# Patient Record
Sex: FEMALE | Race: WHITE | NOT HISPANIC OR LATINO | Employment: FULL TIME | URBAN - METROPOLITAN AREA
[De-identification: names, ages, dates, MRNs, and addresses within clinical notes are randomized per-mention and may not be internally consistent; named-entity substitution may affect disease eponyms.]

---

## 2024-01-25 LAB — HIV 1+2 AB+HIV1 P24 AG SERPL QL IA: NONREACTIVE

## 2024-01-30 LAB
C TRACH RRNA SPEC QL PROBE: NEGATIVE
HBV SURFACE AG SERPL QL IA: NEGATIVE
N GONORRHOEAE, AMPLIFIED DNA: NEGATIVE
RPR: NORMAL
RUBELLA IMMUNE STATUS: NORMAL

## 2024-09-30 ENCOUNTER — ANESTHESIA EVENT (OUTPATIENT)
Dept: OBSTETRICS AND GYNECOLOGY | Facility: HOSPITAL | Age: 24
End: 2024-09-30
Payer: COMMERCIAL

## 2024-09-30 ENCOUNTER — HOSPITAL ENCOUNTER (INPATIENT)
Facility: HOSPITAL | Age: 24
LOS: 2 days | Discharge: HOME | End: 2024-10-02
Attending: OBSTETRICS & GYNECOLOGY | Admitting: OBSTETRICS & GYNECOLOGY
Payer: COMMERCIAL

## 2024-09-30 ENCOUNTER — ANESTHESIA (OUTPATIENT)
Dept: OBSTETRICS AND GYNECOLOGY | Facility: HOSPITAL | Age: 24
End: 2024-09-30
Payer: COMMERCIAL

## 2024-09-30 VITALS — RESPIRATION RATE: 20 BRPM

## 2024-09-30 DIAGNOSIS — O48.0 POST-TERM PREGNANCY, 40-42 WEEKS OF GESTATION: ICD-10-CM

## 2024-09-30 DIAGNOSIS — Z37.9 NORMAL LABOR: ICD-10-CM

## 2024-09-30 LAB
BASOPHILS # BLD AUTO: 0.03 X10(3)/MCL
BASOPHILS NFR BLD AUTO: 0.2 %
EOSINOPHIL # BLD AUTO: 0.01 X10(3)/MCL (ref 0–0.9)
EOSINOPHIL NFR BLD AUTO: 0.1 %
ERYTHROCYTE [DISTWIDTH] IN BLOOD BY AUTOMATED COUNT: 13.2 % (ref 11.5–17)
GROUP & RH: NORMAL
HCT VFR BLD AUTO: 36 % (ref 37–47)
HGB BLD-MCNC: 12.8 G/DL (ref 12–16)
IMM GRANULOCYTES # BLD AUTO: 0.08 X10(3)/MCL (ref 0–0.04)
IMM GRANULOCYTES NFR BLD AUTO: 0.6 %
INDIRECT COOMBS: NORMAL
LYMPHOCYTES # BLD AUTO: 1.84 X10(3)/MCL (ref 0.6–4.6)
LYMPHOCYTES NFR BLD AUTO: 14.1 %
MCH RBC QN AUTO: 31.1 PG (ref 27–31)
MCHC RBC AUTO-ENTMCNC: 35.6 G/DL (ref 33–36)
MCV RBC AUTO: 87.6 FL (ref 80–94)
MONOCYTES # BLD AUTO: 0.64 X10(3)/MCL (ref 0.1–1.3)
MONOCYTES NFR BLD AUTO: 4.9 %
NEUTROPHILS # BLD AUTO: 10.45 X10(3)/MCL (ref 2.1–9.2)
NEUTROPHILS NFR BLD AUTO: 80.1 %
NRBC BLD AUTO-RTO: 0 %
PLATELET # BLD AUTO: 237 X10(3)/MCL (ref 130–400)
PMV BLD AUTO: 10.7 FL (ref 7.4–10.4)
RBC # BLD AUTO: 4.11 X10(6)/MCL (ref 4.2–5.4)
SPECIMEN OUTDATE: NORMAL
T PALLIDUM AB SER QL: NONREACTIVE
WBC # BLD AUTO: 13.05 X10(3)/MCL (ref 4.5–11.5)

## 2024-09-30 PROCEDURE — 51702 INSERT TEMP BLADDER CATH: CPT

## 2024-09-30 PROCEDURE — 63600175 PHARM REV CODE 636 W HCPCS: Performed by: OBSTETRICS & GYNECOLOGY

## 2024-09-30 PROCEDURE — 63600175 PHARM REV CODE 636 W HCPCS: Mod: JZ,JG | Performed by: ANESTHESIOLOGY

## 2024-09-30 PROCEDURE — 86900 BLOOD TYPING SEROLOGIC ABO: CPT | Performed by: OBSTETRICS & GYNECOLOGY

## 2024-09-30 PROCEDURE — 59409 OBSTETRICAL CARE: CPT | Mod: ,,, | Performed by: ANESTHESIOLOGY

## 2024-09-30 PROCEDURE — 99285 EMERGENCY DEPT VISIT HI MDM: CPT | Mod: 25

## 2024-09-30 PROCEDURE — 62326 NJX INTERLAMINAR LMBR/SAC: CPT | Performed by: ANESTHESIOLOGY

## 2024-09-30 PROCEDURE — 11000001 HC ACUTE MED/SURG PRIVATE ROOM

## 2024-09-30 PROCEDURE — 86850 RBC ANTIBODY SCREEN: CPT | Performed by: OBSTETRICS & GYNECOLOGY

## 2024-09-30 PROCEDURE — 86901 BLOOD TYPING SEROLOGIC RH(D): CPT | Performed by: OBSTETRICS & GYNECOLOGY

## 2024-09-30 PROCEDURE — 85025 COMPLETE CBC W/AUTO DIFF WBC: CPT | Performed by: OBSTETRICS & GYNECOLOGY

## 2024-09-30 PROCEDURE — 63600175 PHARM REV CODE 636 W HCPCS: Performed by: STUDENT IN AN ORGANIZED HEALTH CARE EDUCATION/TRAINING PROGRAM

## 2024-09-30 PROCEDURE — 86780 TREPONEMA PALLIDUM: CPT | Performed by: OBSTETRICS & GYNECOLOGY

## 2024-09-30 PROCEDURE — 72100002 HC LABOR CARE, 1ST 8 HOURS

## 2024-09-30 RX ORDER — OXYTOCIN-SODIUM CHLORIDE 0.9% IV SOLN 30 UNIT/500ML 30-0.9/5 UT/ML-%
95 SOLUTION INTRAVENOUS ONCE AS NEEDED
Status: DISCONTINUED | OUTPATIENT
Start: 2024-09-30 | End: 2024-10-01 | Stop reason: SDUPTHER

## 2024-09-30 RX ORDER — FENTANYL/BUPIVACAINE/NS/PF 2-1250MCG
PLASTIC BAG, INJECTION (ML) INJECTION CONTINUOUS PRN
Status: DISCONTINUED | OUTPATIENT
Start: 2024-09-30 | End: 2024-10-01

## 2024-09-30 RX ORDER — ACETAMINOPHEN 325 MG/1
650 TABLET ORAL EVERY 6 HOURS PRN
Status: DISCONTINUED | OUTPATIENT
Start: 2024-09-30 | End: 2024-10-02 | Stop reason: HOSPADM

## 2024-09-30 RX ORDER — EPHEDRINE SULFATE 50 MG/ML
10 INJECTION, SOLUTION INTRAVENOUS
OUTPATIENT
Start: 2024-09-30

## 2024-09-30 RX ORDER — OXYTOCIN 10 [USP'U]/ML
10 INJECTION, SOLUTION INTRAMUSCULAR; INTRAVENOUS ONCE AS NEEDED
Status: DISCONTINUED | OUTPATIENT
Start: 2024-09-30 | End: 2024-10-01 | Stop reason: SDUPTHER

## 2024-09-30 RX ORDER — METHYLERGONOVINE MALEATE 0.2 MG/ML
200 INJECTION INTRAVENOUS ONCE AS NEEDED
Status: DISCONTINUED | OUTPATIENT
Start: 2024-09-30 | End: 2024-10-01 | Stop reason: SDUPTHER

## 2024-09-30 RX ORDER — BUTORPHANOL TARTRATE 2 MG/ML
1 INJECTION INTRAMUSCULAR; INTRAVENOUS
Status: DISCONTINUED | OUTPATIENT
Start: 2024-09-30 | End: 2024-10-02 | Stop reason: HOSPADM

## 2024-09-30 RX ORDER — SODIUM CITRATE AND CITRIC ACID MONOHYDRATE 334; 500 MG/5ML; MG/5ML
30 SOLUTION ORAL ONCE
OUTPATIENT
Start: 2024-09-30 | End: 2024-09-30

## 2024-09-30 RX ORDER — MISOPROSTOL 100 UG/1
800 TABLET ORAL ONCE AS NEEDED
Status: DISCONTINUED | OUTPATIENT
Start: 2024-09-30 | End: 2024-10-01 | Stop reason: SDUPTHER

## 2024-09-30 RX ORDER — OXYTOCIN-SODIUM CHLORIDE 0.9% IV SOLN 30 UNIT/500ML 30-0.9/5 UT/ML-%
0-32 SOLUTION INTRAVENOUS CONTINUOUS
Status: DISCONTINUED | OUTPATIENT
Start: 2024-09-30 | End: 2024-10-02 | Stop reason: HOSPADM

## 2024-09-30 RX ORDER — OXYTOCIN-SODIUM CHLORIDE 0.9% IV SOLN 30 UNIT/500ML 30-0.9/5 UT/ML-%
10 SOLUTION INTRAVENOUS ONCE AS NEEDED
Status: DISCONTINUED | OUTPATIENT
Start: 2024-09-30 | End: 2024-10-02 | Stop reason: HOSPADM

## 2024-09-30 RX ORDER — MUPIROCIN 20 MG/G
OINTMENT TOPICAL
Status: DISCONTINUED | OUTPATIENT
Start: 2024-09-30 | End: 2024-10-02 | Stop reason: HOSPADM

## 2024-09-30 RX ORDER — OXYTOCIN-SODIUM CHLORIDE 0.9% IV SOLN 30 UNIT/500ML 30-0.9/5 UT/ML-%
95 SOLUTION INTRAVENOUS CONTINUOUS PRN
Status: DISCONTINUED | OUTPATIENT
Start: 2024-09-30 | End: 2024-10-01

## 2024-09-30 RX ORDER — BUPIVACAINE HYDROCHLORIDE 2.5 MG/ML
INJECTION, SOLUTION EPIDURAL; INFILTRATION; INTRACAUDAL
Status: COMPLETED | OUTPATIENT
Start: 2024-09-30 | End: 2024-09-30

## 2024-09-30 RX ORDER — CALCIUM CARBONATE 200(500)MG
500 TABLET,CHEWABLE ORAL 3 TIMES DAILY PRN
Status: DISCONTINUED | OUTPATIENT
Start: 2024-09-30 | End: 2024-10-02 | Stop reason: HOSPADM

## 2024-09-30 RX ORDER — BUTORPHANOL TARTRATE 2 MG/ML
2 INJECTION INTRAMUSCULAR; INTRAVENOUS
Status: DISCONTINUED | OUTPATIENT
Start: 2024-09-30 | End: 2024-10-02 | Stop reason: HOSPADM

## 2024-09-30 RX ORDER — SIMETHICONE 80 MG
1 TABLET,CHEWABLE ORAL 4 TIMES DAILY PRN
Status: DISCONTINUED | OUTPATIENT
Start: 2024-09-30 | End: 2024-10-01

## 2024-09-30 RX ORDER — LIDOCAINE HYDROCHLORIDE 10 MG/ML
10 INJECTION, SOLUTION INFILTRATION; PERINEURAL ONCE AS NEEDED
Status: DISCONTINUED | OUTPATIENT
Start: 2024-09-30 | End: 2024-10-02 | Stop reason: HOSPADM

## 2024-09-30 RX ORDER — ONDANSETRON 4 MG/1
8 TABLET, ORALLY DISINTEGRATING ORAL EVERY 8 HOURS PRN
Status: DISCONTINUED | OUTPATIENT
Start: 2024-09-30 | End: 2024-10-01 | Stop reason: SDUPTHER

## 2024-09-30 RX ORDER — CARBOPROST TROMETHAMINE 250 UG/ML
250 INJECTION, SOLUTION INTRAMUSCULAR
Status: DISCONTINUED | OUTPATIENT
Start: 2024-09-30 | End: 2024-10-01 | Stop reason: SDUPTHER

## 2024-09-30 RX ORDER — SODIUM CHLORIDE, SODIUM LACTATE, POTASSIUM CHLORIDE, CALCIUM CHLORIDE 600; 310; 30; 20 MG/100ML; MG/100ML; MG/100ML; MG/100ML
INJECTION, SOLUTION INTRAVENOUS CONTINUOUS
Status: DISCONTINUED | OUTPATIENT
Start: 2024-09-30 | End: 2024-10-02 | Stop reason: HOSPADM

## 2024-09-30 RX ORDER — DIPHENOXYLATE HYDROCHLORIDE AND ATROPINE SULFATE 2.5; .025 MG/1; MG/1
2 TABLET ORAL EVERY 6 HOURS PRN
Status: DISCONTINUED | OUTPATIENT
Start: 2024-09-30 | End: 2024-10-01 | Stop reason: SDUPTHER

## 2024-09-30 RX ADMIN — SODIUM CHLORIDE, POTASSIUM CHLORIDE, SODIUM LACTATE AND CALCIUM CHLORIDE: 600; 310; 30; 20 INJECTION, SOLUTION INTRAVENOUS at 12:09

## 2024-09-30 RX ADMIN — Medication 2 MILLI-UNITS/MIN: at 03:09

## 2024-09-30 RX ADMIN — SODIUM CHLORIDE, POTASSIUM CHLORIDE, SODIUM LACTATE AND CALCIUM CHLORIDE: 600; 310; 30; 20 INJECTION, SOLUTION INTRAVENOUS at 11:09

## 2024-09-30 RX ADMIN — Medication 12 ML/HR: at 03:09

## 2024-09-30 RX ADMIN — SODIUM CHLORIDE, POTASSIUM CHLORIDE, SODIUM LACTATE AND CALCIUM CHLORIDE: 600; 310; 30; 20 INJECTION, SOLUTION INTRAVENOUS at 02:09

## 2024-09-30 RX ADMIN — SODIUM CHLORIDE, POTASSIUM CHLORIDE, SODIUM LACTATE AND CALCIUM CHLORIDE: 600; 310; 30; 20 INJECTION, SOLUTION INTRAVENOUS at 05:09

## 2024-09-30 RX ADMIN — BUPIVACAINE HYDROCHLORIDE 10 ML: 2.5 INJECTION, SOLUTION EPIDURAL; INFILTRATION; INTRACAUDAL; PERINEURAL at 02:09

## 2024-09-30 NOTE — ANESTHESIA PROCEDURE NOTES
Epidural    Patient location during procedure: OB   Reason for block: primary anesthetic   Reason for block: labor analgesia requested by patient and obstetrician  Diagnosis: Active Labor   Start time: 9/30/2024 2:27 PM  Timeout: 9/30/2024 2:26 PM  End time: 9/30/2024 3:02 PM    Staffing  Performing Provider: Carlyle Bourne MD  Authorizing Provider: Carlyle Bourne MD    Staffing  Performed by: Carlyle Bourne MD  Authorized by: Carlyle Bourne MD        Preanesthetic Checklist  Completed: patient identified, IV checked, site marked, risks and benefits discussed, surgical consent, monitors and equipment checked, pre-op evaluation, timeout performed, anesthesia consent given, hand hygiene performed and patient being monitored  Preparation  Patient position: sitting  Prep: ChloraPrep  Patient monitoring: Blood Pressure  Reason for block: primary anesthetic   Epidural  Skin Anesthetic: lidocaine 1%  Administration type: continuous  Approach: midline  Interspace: L3-4    Injection technique: HARPREET saline  Needle and Epidural Catheter  Needle type: Tuohy   Needle gauge: 17  Needle length: 3.5 inches  Catheter type: Traxer  Catheter size: 19 G  Insertion Attempts: 2  Test dose: 3 mL of lidocaine 1.5% with Epi 1-to-200,000  Additional Documentation: negative aspiration for heme and CSF, incremental injection, no paresthesia on injection, no signs/symptoms of IV or SA injection and no significant pain on injection  Needle localization: anatomical landmarks  Assessment  Ease of block: difficult (deep sub-q tissue, tight IS)  Patient's tolerance of the procedure: no complaints  Additional Notes  Initial Bolus: Marcaine 0.25%  (4+3+3) ml, incrementally injected, aspirating b/w each injection for a total of 10 ml.   PCE : Continuous Infusion:12 ml/Hr, Bolus Dose: 4 ml q 15 min, LO: 4/Hr . Patient reports good analgesia after epidural bolus.   No inadvertent dural puncture with Tuohy.  Dural puncture not  performed with spinal needle    Medications:    Medications: bupivacaine (pf) (MARCAINE) injection 0.25% - Epidural   10 mL - 9/30/2024 2:47:00 PM

## 2024-09-30 NOTE — ED PROVIDER NOTES
TYLER NOTE     Admit Date: 2024  TYLER Physician: Vik Juarez  Primary OBGYN:Dr. Zain Pickett    Admit Diagnosis/Chief Complaint: Contractions      Chief Complaint   Patient presents with    Abdominal Pain     Ctx since 0830 yesterday; pt states she was planning a home birth with midwife but now states she needs rest, midwife was uncertain as to her cervical dilation status; pt states she would like pain management       Hospital Course:  Kaleigh Tate is a 24 y.o.  at 41w1d presents complaining of prolonged labor.  She was trying to deliver at home with a midwife and they were having some difficulty checking her and ascertaining her exact dilation.  She had made contact with  in the last several weeks for confirmatory ultrasound for position and estimated gestational age and group B strep.  He is their backup physician.  It appears that she has been in labor for a day and a half with minimal change and she is now ready for hospital delivery and epidural.  There has been no rupture of membranes.      Patient states no complications in this pregnancy.     PMHx:   Past Medical History:   Diagnosis Date    Known health problems: none        PSHx:   Past Surgical History:   Procedure Laterality Date    NO PAST SURGERIES         Patient denies vaginal bleeding, headache, vision changes, RUQ pain, dysuria, fever, and nausea/vomiting.  Fetal Movement: normal.    /85   Pulse 73   Temp 98.4 °F (36.9 °C)   Resp 18   LMP 2023   SpO2 99%   Breastfeeding No   Temp:  [98.4 °F (36.9 °C)] 98.4 °F (36.9 °C)  Pulse:  [73] 73  Resp:  [18] 18  SpO2:  [99 %] 99 %  BP: (135)/(85) 135/85    General: in no apparent distress well developed and well nourished alert oriented times 3  Cardiovascular: Regular rate and rhythm  Lungs: Clear to auscultation bilaterally  Abdominal: soft, nontender, nondistended, no abnormal masses, no epigastric pain fundus soft, nontender consistent with 40 weeks size  FHT present  Back: lumbar tenderness absent   CVA tenderness none  Extremeties no redness or tenderness in the calves or thighs edema trace long term up the legs bilaterally    SSE:   SVE:SVE (PeriWATCH)  Dilation (cm): 3  Effacement (%): 70  Station: -2  Cervical Position: Posterior  Cervical Consistency: Soft  Examined by:: BROOKE Urias Rn  Finley Score: 7  Simplified Finley Score: 5     FHT:  120's, Reactive, Variable decelerations, Category 2, and Reassuring for gestational age  TOCO: Contractions every 5-8 min    Prenatal labs:  A, Rh positive, and Group B strep negative    LABS:   No results found for this or any previous visit (from the past 24 hours).      Imaging Results    None          ASSESMENT: Kaleigh Tate is a 24 y.o.   at 41w1d with prolonged latent labor and category 2 tracing.  Admission to labor and delivery and to primary care OB team for delivery.  Start IV fluids, labs, epidural when available and Pitocin augmentation as needed.  Dr. Baron who is covering for her primary OB has been contacted.          Discharge Diagnosis/Clinical Impression:  :  Normal labor (Primary)  Post-term pregnancy, 40-42 weeks of gestation    Status:Stable      This note was created with the assistance of [employer] voice recognition software. There may be transcription errors as a result of using this technology however minimal. Effort has been made to assure accuracy of transcription but any obvious errors or omissions should be clarified with the author of the document.

## 2024-09-30 NOTE — PROGRESS NOTES
L&D progress note    In to reassess and AROM.      Pt much more comfortable s/p epidural    /62   Pulse 91   Temp 98.2 °F (36.8 °C)   Resp 18   LMP 12/13/2023   SpO2 100%   Breastfeeding No     NAD  Well perfused  Nonlabored breathing  SVE 3/70/-3 at last RN exam    Cat 1 tracing    A/P:  Pt declines AROM  Continue trev Baron MD  09/30/2024

## 2024-09-30 NOTE — H&P
L&D - History & Physical    Chief Complaint: ctx     HPI:      Kaleigh Tate is a 24 y.o.  at 41w1d who presents today for evaluation of above chief complaint.      Abdominal Pain (Ctx since 0830 yesterday; pt states she was planning a home birth with midwife but now states she needs rest, midwife was uncertain as to her cervical dilation status; pt states she would like pain management)    Was doing home birth with midwife, and after 18hrs, pt desires epidural.  Has seen Dr TAYLOR this pregnancy .      Patient's last menstrual period was 2023.     OB History    Para Term  AB Living   1             SAB IAB Ectopic Multiple Live Births                  # Outcome Date GA Lbr David/2nd Weight Sex Type Anes PTL Lv   1 Current                Past Medical History:   Diagnosis Date    Known health problems: none        Past Surgical History:   Procedure Laterality Date    NO PAST SURGERIES         Family History   Problem Relation Name Age of Onset    Diabetes Father         Social History     Socioeconomic History    Marital status: Single   Tobacco Use    Smoking status: Never   Substance and Sexual Activity    Alcohol use: Not Currently    Sexual activity: Yes     Birth control/protection: None       Current Facility-Administered Medications   Medication Dose Route Frequency Provider Last Rate Last Admin    acetaminophen tablet 650 mg  650 mg Oral Q6H PRN Vik Juarez DO        butorphanol injection 1 mg  1 mg Intravenous Q3H PRN Vik Juarez DO        butorphanol injection 2 mg  2 mg Intravenous Q3H PRN Vik Juarez DO        calcium carbonate 200 mg calcium (500 mg) chewable tablet 500 mg  500 mg Oral TID PRN Vik Juarez DO        carboprost injection 250 mcg  250 mcg Intramuscular Q15 Min PRN Vik Juarez DO        diphenoxylate-atropine 2.5-0.025 mg per tablet 2 tablet  2 tablet Oral Q6H PRN Vik Juarez DO        lactated  ringers bolus 1,000 mL  1,000 mL Intravenous PRN Vik Juarez,  mL/hr at 09/30/24 1318 1,000 mL at 09/30/24 1318    lactated ringers bolus 500 mL  500 mL Intravenous PRN Vik Juarez, DO        lactated ringers bolus 500 mL  500 mL Intravenous Once PRN Conor Baron MD        lactated ringers infusion   Intravenous Continuous Vik Juarez  mL/hr at 09/30/24 1231 New Bag at 09/30/24 1231    LIDOcaine HCL 10 mg/ml (1%) injection 10 mL  10 mL Intradermal Once PRN Vik Juarez DO        methylergonovine injection 200 mcg  200 mcg Intramuscular Once PRN Vki Juarez DO        miSOPROStoL tablet 800 mcg  800 mcg Rectal Once PRN Vik Juarez, DO        miSOPROStoL tablet 800 mcg  800 mcg Oral Once PRN Vik Juarez DO        mupirocin 2 % ointment   Nasal On Call Procedure Vik Juarez DO        ondansetron disintegrating tablet 8 mg  8 mg Oral Q8H PRN iVk Juarez DO        oxytocin 30 units/500 mL (60 milliunits/mL) in 0.9% NaCl (non-titrating)  95 manpreet-units/min Intravenous Continuous PRN Vik Juarez DO        oxytocin 30 units/500 mL (60 milliunits/mL) in 0.9% NaCl (non-titrating)  95 manpreet-units/min Intravenous Once PRN Vik Juarez DO        oxytocin 30 units/500 mL (60 milliunits/mL) in 0.9% NaCl (TITRATING)  0-32 manpreet-units/min Intravenous Continuous Conor Baron MD        oxytocin 30 units/500 mL (60 milliunits/mL) in 0.9% NaCl IV bolus from bag  10 Units Intravenous Once PRN Vik Juarez DO        oxytocin injection 10 Units  10 Units Intramuscular Once PRN Vik Juarez DO        simethicone chewable tablet 80 mg  1 tablet Oral QID PRN Vik Juarez DO        tranexamic acid (CYKLOKAPRON) 1,000 mg in 0.9% NaCl 100 mL IVPB (MB+)  1,000 mg Intravenous Q30 Min PRN Vik Juarez, DO           Review of patient's allergies indicates:  No Known  Allergies      Physical Exam:      BP (!) 140/79   Pulse 70   Temp 98.4 °F (36.9 °C)   Resp 18   LMP 12/13/2023   SpO2 99%   Breastfeeding No   There is no height or weight on file to calculate BMI.     APPEARANCE: Well nourished, well developed, in no acute distress.  PSYCH: Appropriate mood and affect.  CHEST: Normal respiratory effort,  CV: Normal capillary refill.  ABDOMEN: Soft.  No tenderness or masses.    PELVIC:  3/70/-2 at last exam  EXTREMITIES: No edema       Results:     Results for orders placed or performed during the hospital encounter of 09/30/24   SYPHILIS ANTIBODY (WITH REFLEX RPR)    Collection Time: 09/30/24 12:07 PM   Result Value Ref Range    Syphilis Antibody Nonreactive Nonreactive, Equivocal   CBC with Differential    Collection Time: 09/30/24 12:07 PM   Result Value Ref Range    WBC 13.05 (H) 4.50 - 11.50 x10(3)/mcL    RBC 4.11 (L) 4.20 - 5.40 x10(6)/mcL    Hgb 12.8 12.0 - 16.0 g/dL    Hct 36.0 (L) 37.0 - 47.0 %    MCV 87.6 80.0 - 94.0 fL    MCH 31.1 (H) 27.0 - 31.0 pg    MCHC 35.6 33.0 - 36.0 g/dL    RDW 13.2 11.5 - 17.0 %    Platelet 237 130 - 400 x10(3)/mcL    MPV 10.7 (H) 7.4 - 10.4 fL    Neut % 80.1 %    Lymph % 14.1 %    Mono % 4.9 %    Eos % 0.1 %    Basophil % 0.2 %    Lymph # 1.84 0.6 - 4.6 x10(3)/mcL    Neut # 10.45 (H) 2.1 - 9.2 x10(3)/mcL    Mono # 0.64 0.1 - 1.3 x10(3)/mcL    Eos # 0.01 0 - 0.9 x10(3)/mcL    Baso # 0.03 <=0.2 x10(3)/mcL    IG# 0.08 (H) 0 - 0.04 x10(3)/mcL    IG% 0.6 %    NRBC% 0.0 %         Assessment/Plan:     Active Problem List with Overview Notes    Diagnosis Date Noted    Normal labor 09/30/2024    Post-term pregnancy, 40-42 weeks of gestation 09/30/2024      Start augmentation after epidural    Conor Baron MD  09/30/2024 1:38 PM

## 2024-09-30 NOTE — ANESTHESIA PREPROCEDURE EVALUATION
2024  Kaleigh Tate is a 24 y.o., female, , EGA 41 wks, in active labor who requests a TRINA for labor analgesia      Pre-op Assessment    I have reviewed the Patient Summary Reports.     I have reviewed the Nursing Notes. I have reviewed the NPO Status.   I have reviewed the Medications.     Review of Systems  Anesthesia Hx:  No problems with previous Anesthesia             Denies Family Hx of Anesthesia complications.    Denies Personal Hx of Anesthesia complications.                    Social:  Non-Smoker       Cardiovascular:  Cardiovascular Normal                                            Pulmonary:  Pulmonary Normal                       Endocrine:  Endocrine Normal          Obesity / BMI > 30      Physical Exam  General: Alert and Oriented  In active labor  Airway:  Mallampati: II   Mouth Opening: Normal  TM Distance: Normal  Tongue: Normal  Neck ROM: Normal ROM    Dental:  Intact    Chest/Lungs:  Normal Respiratory Rate    Heart:  Rate: Normal  Rhythm: Regular Rhythm        Anesthesia Plan  Type of Anesthesia, risks & benefits discussed:    Anesthesia Type: Epidural  Intra-op Monitoring Plan: Standard ASA Monitors  Post Op Pain Control Plan: epidural analgesia  Induction:  IV  Informed Consent: Informed consent signed with the Patient and all parties understand the risks and agree with anesthesia plan.  All questions answered. Patient consented to blood products? Yes  ASA Score: 2  Day of Surgery Review of History & Physical: H&P Update referred to the surgeon/provider.  Anesthesia Plan Notes: Premedication: Oral Bicitra and Lactated Ringers Preload  Technique: Epidural for labor analgesia - plan to use if functioning well if C/S is required for delivery  Please refer to nursing records for toco fetal heart tone information    Ready For Surgery From Anesthesia Perspective.     .

## 2024-10-01 PROCEDURE — 63600175 PHARM REV CODE 636 W HCPCS: Performed by: STUDENT IN AN ORGANIZED HEALTH CARE EDUCATION/TRAINING PROGRAM

## 2024-10-01 PROCEDURE — 11000001 HC ACUTE MED/SURG PRIVATE ROOM

## 2024-10-01 PROCEDURE — 72200005 HC VAGINAL DELIVERY LEVEL II

## 2024-10-01 PROCEDURE — 25000003 PHARM REV CODE 250: Performed by: ANESTHESIOLOGY

## 2024-10-01 PROCEDURE — 25000003 PHARM REV CODE 250: Performed by: STUDENT IN AN ORGANIZED HEALTH CARE EDUCATION/TRAINING PROGRAM

## 2024-10-01 PROCEDURE — 0KQM0ZZ REPAIR PERINEUM MUSCLE, OPEN APPROACH: ICD-10-PCS | Performed by: STUDENT IN AN ORGANIZED HEALTH CARE EDUCATION/TRAINING PROGRAM

## 2024-10-01 PROCEDURE — 25000003 PHARM REV CODE 250: Performed by: OBSTETRICS & GYNECOLOGY

## 2024-10-01 RX ORDER — PRENATAL WITH FERROUS FUM AND FOLIC ACID 3080; 920; 120; 400; 22; 1.84; 3; 20; 10; 1; 12; 200; 27; 25; 2 [IU]/1; [IU]/1; MG/1; [IU]/1; MG/1; MG/1; MG/1; MG/1; MG/1; MG/1; UG/1; MG/1; MG/1; MG/1; MG/1
1 TABLET ORAL DAILY
Status: DISCONTINUED | OUTPATIENT
Start: 2024-10-01 | End: 2024-10-02 | Stop reason: HOSPADM

## 2024-10-01 RX ORDER — SODIUM CHLORIDE 0.9 % (FLUSH) 0.9 %
10 SYRINGE (ML) INJECTION
Status: DISCONTINUED | OUTPATIENT
Start: 2024-10-01 | End: 2024-10-02 | Stop reason: HOSPADM

## 2024-10-01 RX ORDER — METHYLERGONOVINE MALEATE 0.2 MG/ML
200 INJECTION INTRAVENOUS ONCE AS NEEDED
Status: DISCONTINUED | OUTPATIENT
Start: 2024-10-01 | End: 2024-10-02 | Stop reason: HOSPADM

## 2024-10-01 RX ORDER — DOCUSATE SODIUM 100 MG/1
200 CAPSULE, LIQUID FILLED ORAL 2 TIMES DAILY PRN
Status: DISCONTINUED | OUTPATIENT
Start: 2024-10-01 | End: 2024-10-02 | Stop reason: HOSPADM

## 2024-10-01 RX ORDER — MISOPROSTOL 100 UG/1
800 TABLET ORAL ONCE AS NEEDED
Status: DISCONTINUED | OUTPATIENT
Start: 2024-10-01 | End: 2024-10-02 | Stop reason: HOSPADM

## 2024-10-01 RX ORDER — OXYTOCIN-SODIUM CHLORIDE 0.9% IV SOLN 30 UNIT/500ML 30-0.9/5 UT/ML-%
95 SOLUTION INTRAVENOUS ONCE AS NEEDED
Status: DISCONTINUED | OUTPATIENT
Start: 2024-10-01 | End: 2024-10-02 | Stop reason: HOSPADM

## 2024-10-01 RX ORDER — IBUPROFEN 600 MG/1
600 TABLET ORAL EVERY 6 HOURS
Status: DISCONTINUED | OUTPATIENT
Start: 2024-10-01 | End: 2024-10-02 | Stop reason: HOSPADM

## 2024-10-01 RX ORDER — OXYTOCIN-SODIUM CHLORIDE 0.9% IV SOLN 30 UNIT/500ML 30-0.9/5 UT/ML-%
95 SOLUTION INTRAVENOUS CONTINUOUS PRN
Status: DISCONTINUED | OUTPATIENT
Start: 2024-10-01 | End: 2024-10-02 | Stop reason: HOSPADM

## 2024-10-01 RX ORDER — DIPHENHYDRAMINE HYDROCHLORIDE 50 MG/ML
25 INJECTION INTRAMUSCULAR; INTRAVENOUS EVERY 4 HOURS PRN
Status: DISCONTINUED | OUTPATIENT
Start: 2024-10-01 | End: 2024-10-02 | Stop reason: HOSPADM

## 2024-10-01 RX ORDER — DIPHENOXYLATE HYDROCHLORIDE AND ATROPINE SULFATE 2.5; .025 MG/1; MG/1
2 TABLET ORAL EVERY 6 HOURS PRN
Status: DISCONTINUED | OUTPATIENT
Start: 2024-10-01 | End: 2024-10-02 | Stop reason: HOSPADM

## 2024-10-01 RX ORDER — CARBOPROST TROMETHAMINE 250 UG/ML
250 INJECTION, SOLUTION INTRAMUSCULAR
Status: DISCONTINUED | OUTPATIENT
Start: 2024-10-01 | End: 2024-10-02 | Stop reason: HOSPADM

## 2024-10-01 RX ORDER — DIPHENHYDRAMINE HCL 25 MG
25 CAPSULE ORAL EVERY 4 HOURS PRN
Status: DISCONTINUED | OUTPATIENT
Start: 2024-10-01 | End: 2024-10-02 | Stop reason: HOSPADM

## 2024-10-01 RX ORDER — SIMETHICONE 80 MG
1 TABLET,CHEWABLE ORAL EVERY 6 HOURS PRN
Status: DISCONTINUED | OUTPATIENT
Start: 2024-10-01 | End: 2024-10-02 | Stop reason: HOSPADM

## 2024-10-01 RX ORDER — OXYTOCIN 10 [USP'U]/ML
10 INJECTION, SOLUTION INTRAMUSCULAR; INTRAVENOUS ONCE AS NEEDED
Status: DISCONTINUED | OUTPATIENT
Start: 2024-10-01 | End: 2024-10-02 | Stop reason: HOSPADM

## 2024-10-01 RX ORDER — OXYTOCIN-SODIUM CHLORIDE 0.9% IV SOLN 30 UNIT/500ML 30-0.9/5 UT/ML-%
10 SOLUTION INTRAVENOUS ONCE AS NEEDED
Status: DISCONTINUED | OUTPATIENT
Start: 2024-10-01 | End: 2024-10-02 | Stop reason: HOSPADM

## 2024-10-01 RX ORDER — ACETAMINOPHEN 325 MG/1
650 TABLET ORAL EVERY 6 HOURS SCHEDULED
Status: DISCONTINUED | OUTPATIENT
Start: 2024-10-01 | End: 2024-10-02 | Stop reason: HOSPADM

## 2024-10-01 RX ORDER — OXYCODONE AND ACETAMINOPHEN 5; 325 MG/1; MG/1
1 TABLET ORAL EVERY 4 HOURS PRN
Status: DISCONTINUED | OUTPATIENT
Start: 2024-10-01 | End: 2024-10-02 | Stop reason: HOSPADM

## 2024-10-01 RX ORDER — HYDROCORTISONE 25 MG/G
CREAM TOPICAL 3 TIMES DAILY PRN
Status: DISCONTINUED | OUTPATIENT
Start: 2024-10-01 | End: 2024-10-02 | Stop reason: HOSPADM

## 2024-10-01 RX ORDER — ONDANSETRON 4 MG/1
8 TABLET, ORALLY DISINTEGRATING ORAL EVERY 8 HOURS PRN
Status: DISCONTINUED | OUTPATIENT
Start: 2024-10-01 | End: 2024-10-02 | Stop reason: HOSPADM

## 2024-10-01 RX ADMIN — PRENATAL VITAMINS-IRON FUMARATE 27 MG IRON-FOLIC ACID 0.8 MG TABLET 1 TABLET: at 09:10

## 2024-10-01 RX ADMIN — IBUPROFEN 600 MG: 600 TABLET, FILM COATED ORAL at 11:10

## 2024-10-01 RX ADMIN — IBUPROFEN 600 MG: 600 TABLET, FILM COATED ORAL at 05:10

## 2024-10-01 RX ADMIN — ACETAMINOPHEN 325MG 650 MG: 325 TABLET ORAL at 03:10

## 2024-10-01 RX ADMIN — ACETAMINOPHEN 325MG 650 MG: 325 TABLET ORAL at 09:10

## 2024-10-01 RX ADMIN — Medication 12 ML/HR: at 02:10

## 2024-10-01 RX ADMIN — TRANEXAMIC ACID 1000 MG: 100 INJECTION, SOLUTION INTRAVENOUS at 03:10

## 2024-10-01 RX ADMIN — OXYTOCIN 95 MILLI-UNITS/MIN: 10 INJECTION, SOLUTION INTRAMUSCULAR; INTRAVENOUS at 04:10

## 2024-10-01 RX ADMIN — ONDANSETRON 8 MG: 4 TABLET, ORALLY DISINTEGRATING ORAL at 01:10

## 2024-10-01 RX ADMIN — DOCUSATE SODIUM 200 MG: 100 CAPSULE, LIQUID FILLED ORAL at 09:10

## 2024-10-01 NOTE — PLAN OF CARE
Problem:  Fall Injury Risk  Goal: Absence of Fall, Infant Drop and Related Injury  Outcome: Progressing     Problem: Adult Inpatient Plan of Care  Goal: Plan of Care Review  Outcome: Progressing  Goal: Patient-Specific Goal (Individualized)  Outcome: Progressing  Goal: Absence of Hospital-Acquired Illness or Injury  Outcome: Progressing  Goal: Optimal Comfort and Wellbeing  Outcome: Progressing  Goal: Readiness for Transition of Care  Outcome: Progressing     Problem: Infection  Goal: Absence of Infection Signs and Symptoms  Outcome: Progressing     Problem: Pain Acute  Goal: Optimal Pain Control and Function  Outcome: Progressing     Problem: Postpartum (Vaginal Delivery)  Goal: Successful Parent Role Transition  Outcome: Progressing  Goal: Hemostasis  Outcome: Progressing  Goal: Absence of Infection Signs and Symptoms  Outcome: Progressing  Goal: Anesthesia/Sedation Recovery  Outcome: Progressing  Goal: Optimal Pain Control and Function  Outcome: Progressing  Goal: Effective Urinary Elimination  Outcome: Progressing

## 2024-10-01 NOTE — LACTATION NOTE
This note was copied from a baby's chart.  Baby was sleepy initially during assessment. Unable to get baby to wake up to feed. Unable to hand express colostrum. Discussed formula and donor milk. Mom has a cousin with colostrum that she wants to use. Donor milk (not from a milk bank) consent signed just in case. Pt agreed not to feed baby cousins colostrum until she informed nurses so nurses could inform physician prior to.   1015-baby eventually woke up and latched, nursed well with swallows heard. Mom c/o pain with initial latch, but pain subsided.

## 2024-10-01 NOTE — L&D DELIVERY NOTE
Message from [employer]:  Kiah Talbot would like a refill of the following medications:     dicyclomine (BENTYL) 10 MG capsule [Max Perry MD]   Patient Comment: Can I please get this perscription refilled. I just missed getting my refill which  on Lia 10, 2018    Preferred pharmacy: [id number] - [place], [place] - [address]  Delivery method: Pickup       Physician Delivery Note    24 y.o. year old  at 41w2d    Pregnancy Complication:    Patient Active Problem List    Diagnosis Date Noted    Normal labor 2024    Post-term pregnancy, 40-42 weeks of gestation 2024       Labor:  augmented    Anesthesia:  epidural    Episiotomy/Laceration/Repair:  large 2nd degree extending high into the vagina.  Repaired with 2-0 vicryl on a CT.    Delivery:       Position:  OA    Placenta:  spontaneous    Complications:  none    Comments:    Baby OA. Anterior and posterior shoulders atraumatically delivered with gentle traction followed by the trunk and LE. Infant vigorous with spontaneous cry. Baby placed on mother's chest for skin to skin contact. Pt declined cord clamping. Due to significant bleeding from the laceration, placenta delivered by controlled cord traction intact and placed with baby per maternal request. Normal uterine tone with oxytocin infusion.  Rectum and vagina clear of sponges.. [place] and patient stable in the delivery room with nursing present.    Conor Baron MD  10/01/2024 4:20 AM

## 2024-10-02 VITALS
HEART RATE: 67 BPM | SYSTOLIC BLOOD PRESSURE: 116 MMHG | OXYGEN SATURATION: 100 % | DIASTOLIC BLOOD PRESSURE: 72 MMHG | RESPIRATION RATE: 18 BRPM | TEMPERATURE: 98 F

## 2024-10-02 LAB
BASOPHILS # BLD AUTO: 0.05 X10(3)/MCL
BASOPHILS NFR BLD AUTO: 0.4 %
EOSINOPHIL # BLD AUTO: 0.14 X10(3)/MCL (ref 0–0.9)
EOSINOPHIL NFR BLD AUTO: 1.2 %
ERYTHROCYTE [DISTWIDTH] IN BLOOD BY AUTOMATED COUNT: 13.9 % (ref 11.5–17)
HCT VFR BLD AUTO: 26.2 % (ref 37–47)
HGB BLD-MCNC: 8.9 G/DL (ref 12–16)
IMM GRANULOCYTES # BLD AUTO: 0.06 X10(3)/MCL (ref 0–0.04)
IMM GRANULOCYTES NFR BLD AUTO: 0.5 %
LYMPHOCYTES # BLD AUTO: 3.33 X10(3)/MCL (ref 0.6–4.6)
LYMPHOCYTES NFR BLD AUTO: 27.8 %
MCH RBC QN AUTO: 31 PG (ref 27–31)
MCHC RBC AUTO-ENTMCNC: 34 G/DL (ref 33–36)
MCV RBC AUTO: 91.3 FL (ref 80–94)
MONOCYTES # BLD AUTO: 0.87 X10(3)/MCL (ref 0.1–1.3)
MONOCYTES NFR BLD AUTO: 7.3 %
NEUTROPHILS # BLD AUTO: 7.52 X10(3)/MCL (ref 2.1–9.2)
NEUTROPHILS NFR BLD AUTO: 62.8 %
NRBC BLD AUTO-RTO: 0 %
PLATELET # BLD AUTO: 211 X10(3)/MCL (ref 130–400)
PMV BLD AUTO: 11.2 FL (ref 7.4–10.4)
RBC # BLD AUTO: 2.87 X10(6)/MCL (ref 4.2–5.4)
WBC # BLD AUTO: 11.97 X10(3)/MCL (ref 4.5–11.5)

## 2024-10-02 PROCEDURE — 25000003 PHARM REV CODE 250: Performed by: STUDENT IN AN ORGANIZED HEALTH CARE EDUCATION/TRAINING PROGRAM

## 2024-10-02 PROCEDURE — 85025 COMPLETE CBC W/AUTO DIFF WBC: CPT | Performed by: STUDENT IN AN ORGANIZED HEALTH CARE EDUCATION/TRAINING PROGRAM

## 2024-10-02 PROCEDURE — 36415 COLL VENOUS BLD VENIPUNCTURE: CPT | Performed by: STUDENT IN AN ORGANIZED HEALTH CARE EDUCATION/TRAINING PROGRAM

## 2024-10-02 RX ADMIN — ACETAMINOPHEN 325MG 650 MG: 325 TABLET ORAL at 04:10

## 2024-10-02 RX ADMIN — IBUPROFEN 600 MG: 600 TABLET, FILM COATED ORAL at 06:10

## 2024-10-02 RX ADMIN — DOCUSATE SODIUM 200 MG: 100 CAPSULE, LIQUID FILLED ORAL at 12:10

## 2024-10-02 RX ADMIN — IBUPROFEN 600 MG: 600 TABLET, FILM COATED ORAL at 12:10

## 2024-10-02 RX ADMIN — ACETAMINOPHEN 325MG 650 MG: 325 TABLET ORAL at 02:10

## 2024-10-02 NOTE — PLAN OF CARE
Problem: Adult Inpatient Plan of Care  Goal: Plan of Care Review  Outcome: Met  Goal: Patient-Specific Goal (Individualized)  Outcome: Met  Goal: Absence of Hospital-Acquired Illness or Injury  Outcome: Met  Goal: Optimal Comfort and Wellbeing  Outcome: Met  Goal: Readiness for Transition of Care  Outcome: Met     Problem: Pain Acute  Goal: Optimal Pain Control and Function  Outcome: Met     Problem: Postpartum (Vaginal Delivery)  Goal: Successful Parent Role Transition  Outcome: Met  Goal: Hemostasis  Outcome: Met  Goal: Absence of Infection Signs and Symptoms  Outcome: Met  Goal: Anesthesia/Sedation Recovery  Outcome: Met  Goal: Optimal Pain Control and Function  Outcome: Met  Goal: Effective Urinary Elimination  Outcome: Met     Problem: Infection  Goal: Absence of Infection Signs and Symptoms  Outcome: Met

## 2024-10-02 NOTE — NURSING
Written discharge instructions were given and reviewed from the postpartum and  care [facility] booklet as well as prescribed meds, follow up appointments, and precautions to take at home. Also reviewed written and verbal instructions related to reasons to call MD/when to come back to the ER. Pt verbalized understanding.

## 2024-10-02 NOTE — PROGRESS NOTES
PostPartum Progress Note        Subjective:      Postpartum Day #1 after   .  Patient sleeping. FOB states:    pt is without complaints. Lochia decreasing.  Pain is well controlled. Patient is ambulating without lightheadedness. Tolerating regular diet.    Objective:      Temp:  [97.7 °F (36.5 °C)-99.4 °F (37.4 °C)] 97.9 °F (36.6 °C)  Pulse:  [70-80] 78  Resp:  [17-18] 18  SpO2:  [97 %-99 %] 98 %  BP: (110-144)/(71-83) 128/83  No intake or output data in the 24 hours ending 10/02/24 0657  There is no height or weight on file to calculate BMI.    General: no acute distress  Abdomen: soft, appropriately tender  Extremities: non-tender, symmetric    Group & Rh   Date Value Ref Range Status   2024 A POS  Final   2024 A POS  Final     Recent Results (from the past 2 weeks)   CBC with Differential    Collection Time: 10/02/24  4:15 AM   Result Value Ref Range    WBC 11.97 (H) 4.50 - 11.50 x10(3)/mcL    Hgb 8.9 (L) 12.0 - 16.0 g/dL    Hct 26.2 (L) 37.0 - 47.0 %    Platelet 211 130 - 400 x10(3)/mcL   CBC with Differential    Collection Time: 24 12:07 PM   Result Value Ref Range    WBC 13.05 (H) 4.50 - 11.50 x10(3)/mcL    Hgb 12.8 12.0 - 16.0 g/dL    Hct 36.0 (L) 37.0 - 47.0 %    Platelet 237 130 - 400 x10(3)/mcL          Assessment/Plan     24 y.o.  S/P , PPD # 1 - Doing Well   -Continue routine postpartum care  -Anticipated d/c PPD#2    Active Problem List with Overview Notes    Diagnosis Date Noted    Normal labor 2024    Post-term pregnancy, 40-42 weeks of gestation 2024           Conor Baron MD  10/02/2024 6:57 AM

## 2024-10-02 NOTE — ANESTHESIA POSTPROCEDURE EVALUATION
Anesthesia Post Evaluation    Patient: Kaleigh Tate    Procedure(s) Performed: * No procedures listed *    Final Anesthesia Type: epidural      Patient location during evaluation: floor  Patient participation: Yes- Able to Participate  Level of consciousness: awake and oriented  Post-procedure vital signs: reviewed and stable  Pain management: adequate  Airway patency: patent    PONV status at discharge: No PONV  Anesthetic complications: no      Cardiovascular status: stable and hemodynamically stable  Respiratory status: unassisted and spontaneous ventilation  Hydration status: euvolemic  Follow-up not needed.          Neuraxial Block resolved, Sensory & Motor intact, denies HA    Vitals Value Taken Time   /72 10/02/24 0728   Temp 36.6 °C (97.8 °F) 10/02/24 0728   Pulse 67 10/02/24 0728   Resp 18 10/01/24 1829   SpO2 100 % 10/02/24 0728         No case tracking events are documented in the log.      Pain/Charlotte Score: Pain Rating Prior to Med Admin: 5 (10/2/2024  6:12 AM)

## 2024-10-03 NOTE — DISCHARGE SUMMARY
"Delivery Discharge Summary  Obstetrics      Primary OB Clinician: CORETTA Pickett MD  Discharge Provider: Conor Baron MD    Admission date: 2024  Discharge date: 10/2/2024  4:32 PM 10/03/2024    Admit Dx:  Patient Active Problem List   Diagnosis    Normal labor    Post-term pregnancy, 40-42 weeks of gestation        Discharge Dx:    Same    Procedure: vaginal delivery    Hospital Course:  Kaleigh Tate is a 24 y.o. now  who was admitted on 2024 for delivery. Patient delivered a viable . Please see delivery note for further details. Pt was in stable condition post delivery and was transferred to the Mother-Baby Unit. Her postpartum course was uncomplicated. On the date of discharge, patient's pain is controlled with oral pain medications. She is tolerating ambulation without SOB or CP, and PO diet without N/V. Reported lochia is within the normal range. Pt in stable condition and ready for discharge.     Pertinent studies:  Postpartum CBC  Lab Results   Component Value Date    WBC 11.97 (H) 10/02/2024    HGB 8.9 (L) 10/02/2024    HCT 26.2 (L) 10/02/2024    MCV 91.3 10/02/2024     10/02/2024       Delivery:    Episiotomy: None   Lacerations: 2nd   Repair suture: None   Repair # of packets: 2   Blood loss (ml):       Birth information:  YOB: 2026   Time of birth: 3:31 AM   Sex: female   Delivery type: Vaginal, Spontaneous   Gestational Age: 41w2d     Measurements    Weight: 4180 g  Weight (lbs): 9 lb 3.4 oz  Length: 52.1 cm  Length (in): 20.5"  Head circumference: 34.9 cm         Delivery Clinician: Delivery Providers    Delivering clinician: Conor Baron MD   Provider Role    Lucretia Hand RN Registered Nurse    Marie Pratt RN Registered Nurse    Viviana Styles RN Registered Nurse    Danya Watts RN Registered Nurse             Additional  information:  Forceps:    Vacuum:    Breech:    Observed anomalies      Living?:     Apgars    Living status: " Living  Apgar Component Scores:  1 min.:  5 min.:  10 min.:  15 min.:  20 min.:    Skin color:  1  1       Heart rate:  2  2       Reflex irritability:  2  2       Muscle tone:  2  2       Respiratory effort:  1  1       Total:  8  8       Apgars assigned by: MICHELLE         Placenta: Delivered:       appearance    Disposition: To home, self care    Follow Up: 6 weeks    Patient Instructions:   1. Call the office for any bleeding >2 pads/hour for >2 hours, temperature >100.4, pain that is uncontrolled with medications, or for any other concerns.  2. Pelvic rest and no tub baths x 6 weeks.

## 2025-06-21 ENCOUNTER — HOSPITAL ENCOUNTER (EMERGENCY)
Facility: HOSPITAL | Age: 25
Discharge: HOME | End: 2025-06-21
Attending: STUDENT IN AN ORGANIZED HEALTH CARE EDUCATION/TRAINING PROGRAM
Payer: COMMERCIAL

## 2025-06-21 VITALS
SYSTOLIC BLOOD PRESSURE: 130 MMHG | HEIGHT: 64 IN | RESPIRATION RATE: 21 BRPM | DIASTOLIC BLOOD PRESSURE: 80 MMHG | HEART RATE: 83 BPM | WEIGHT: 200 LBS | BODY MASS INDEX: 34.15 KG/M2 | OXYGEN SATURATION: 100 % | TEMPERATURE: 98 F

## 2025-06-21 DIAGNOSIS — R11.2 NAUSEA AND VOMITING, UNSPECIFIED VOMITING TYPE: ICD-10-CM

## 2025-06-21 DIAGNOSIS — S09.90XA CLOSED HEAD INJURY, INITIAL ENCOUNTER: ICD-10-CM

## 2025-06-21 DIAGNOSIS — E86.0 DEHYDRATION: ICD-10-CM

## 2025-06-21 DIAGNOSIS — A05.9 FOOD POISONING: Primary | ICD-10-CM

## 2025-06-21 LAB
ALBUMIN SERPL-MCNC: 4.2 G/DL (ref 3.5–5)
ALBUMIN/GLOB SERPL: 1.3 RATIO (ref 1.1–2)
ALP SERPL-CCNC: 74 UNIT/L (ref 40–150)
ALT SERPL-CCNC: 13 UNIT/L (ref 0–55)
ANION GAP SERPL CALC-SCNC: 11 MEQ/L
AST SERPL-CCNC: 16 UNIT/L (ref 11–45)
B-HCG UR QL: NEGATIVE
BACTERIA #/AREA URNS AUTO: ABNORMAL /HPF
BASOPHILS # BLD AUTO: 0.04 X10(3)/MCL
BASOPHILS NFR BLD AUTO: 0.3 %
BILIRUB SERPL-MCNC: 0.6 MG/DL
BILIRUB UR QL STRIP.AUTO: NEGATIVE
BUN SERPL-MCNC: 17.7 MG/DL (ref 7–18.7)
CALCIUM SERPL-MCNC: 9.4 MG/DL (ref 8.4–10.2)
CHLORIDE SERPL-SCNC: 105 MMOL/L (ref 98–107)
CLARITY UR: CLEAR
CO2 SERPL-SCNC: 23 MMOL/L (ref 22–29)
COLOR UR AUTO: ABNORMAL
CREAT SERPL-MCNC: 0.7 MG/DL (ref 0.55–1.02)
CREAT/UREA NIT SERPL: 25
EOSINOPHIL # BLD AUTO: 0.02 X10(3)/MCL (ref 0–0.9)
EOSINOPHIL NFR BLD AUTO: 0.2 %
ERYTHROCYTE [DISTWIDTH] IN BLOOD BY AUTOMATED COUNT: 12.1 % (ref 11.5–17)
GFR SERPLBLD CREATININE-BSD FMLA CKD-EPI: >60 ML/MIN/1.73/M2
GLOBULIN SER-MCNC: 3.2 GM/DL (ref 2.4–3.5)
GLUCOSE SERPL-MCNC: 108 MG/DL (ref 74–100)
GLUCOSE UR QL STRIP: NORMAL
HCT VFR BLD AUTO: 43.3 % (ref 37–47)
HGB BLD-MCNC: 14.8 G/DL (ref 12–16)
HGB UR QL STRIP: NEGATIVE
IMM GRANULOCYTES # BLD AUTO: 0.05 X10(3)/MCL (ref 0–0.04)
IMM GRANULOCYTES NFR BLD AUTO: 0.4 %
KETONES UR QL STRIP: ABNORMAL
LEUKOCYTE ESTERASE UR QL STRIP: NEGATIVE
LYMPHOCYTES # BLD AUTO: 0.87 X10(3)/MCL (ref 0.6–4.6)
LYMPHOCYTES NFR BLD AUTO: 7.2 %
MCH RBC QN AUTO: 29.4 PG (ref 27–31)
MCHC RBC AUTO-ENTMCNC: 34.2 G/DL (ref 33–36)
MCV RBC AUTO: 86.1 FL (ref 80–94)
MONOCYTES # BLD AUTO: 0.46 X10(3)/MCL (ref 0.1–1.3)
MONOCYTES NFR BLD AUTO: 3.8 %
MUCOUS THREADS URNS QL MICRO: ABNORMAL /LPF
NEUTROPHILS # BLD AUTO: 10.62 X10(3)/MCL (ref 2.1–9.2)
NEUTROPHILS NFR BLD AUTO: 88.1 %
NITRITE UR QL STRIP: NEGATIVE
NRBC BLD AUTO-RTO: 0 %
PH UR STRIP: 5.5 [PH]
PLATELET # BLD AUTO: 312 X10(3)/MCL (ref 130–400)
PMV BLD AUTO: 9.6 FL (ref 7.4–10.4)
POTASSIUM SERPL-SCNC: 4.1 MMOL/L (ref 3.5–5.1)
PROT SERPL-MCNC: 7.4 GM/DL (ref 6.4–8.3)
PROT UR QL STRIP: NEGATIVE
RBC # BLD AUTO: 5.03 X10(6)/MCL (ref 4.2–5.4)
RBC #/AREA URNS AUTO: ABNORMAL /HPF
SODIUM SERPL-SCNC: 139 MMOL/L (ref 136–145)
SP GR UR STRIP.AUTO: 1.02 (ref 1–1.03)
SQUAMOUS #/AREA URNS LPF: ABNORMAL /HPF
UROBILINOGEN UR STRIP-ACNC: NORMAL
WBC # BLD AUTO: 12.06 X10(3)/MCL (ref 4.5–11.5)
WBC #/AREA URNS AUTO: ABNORMAL /HPF

## 2025-06-21 PROCEDURE — 63600175 PHARM REV CODE 636 W HCPCS: Performed by: STUDENT IN AN ORGANIZED HEALTH CARE EDUCATION/TRAINING PROGRAM

## 2025-06-21 PROCEDURE — 25000003 PHARM REV CODE 250: Performed by: STUDENT IN AN ORGANIZED HEALTH CARE EDUCATION/TRAINING PROGRAM

## 2025-06-21 PROCEDURE — 25000003 PHARM REV CODE 250

## 2025-06-21 PROCEDURE — 81025 URINE PREGNANCY TEST: CPT

## 2025-06-21 PROCEDURE — 81001 URINALYSIS AUTO W/SCOPE: CPT

## 2025-06-21 PROCEDURE — 96361 HYDRATE IV INFUSION ADD-ON: CPT

## 2025-06-21 PROCEDURE — 85025 COMPLETE CBC W/AUTO DIFF WBC: CPT

## 2025-06-21 PROCEDURE — 96374 THER/PROPH/DIAG INJ IV PUSH: CPT

## 2025-06-21 PROCEDURE — 80053 COMPREHEN METABOLIC PANEL: CPT

## 2025-06-21 PROCEDURE — 99285 EMERGENCY DEPT VISIT HI MDM: CPT | Mod: 25

## 2025-06-21 RX ORDER — ONDANSETRON HYDROCHLORIDE 2 MG/ML
4 INJECTION, SOLUTION INTRAVENOUS
Status: COMPLETED | OUTPATIENT
Start: 2025-06-21 | End: 2025-06-21

## 2025-06-21 RX ORDER — ONDANSETRON 4 MG/1
4 TABLET, ORALLY DISINTEGRATING ORAL EVERY 6 HOURS PRN
Qty: 20 TABLET | Refills: 0 | Status: SHIPPED | OUTPATIENT
Start: 2025-06-21 | End: 2025-06-28

## 2025-06-21 RX ORDER — ONDANSETRON 4 MG/1
8 TABLET, ORALLY DISINTEGRATING ORAL
Status: COMPLETED | OUTPATIENT
Start: 2025-06-21 | End: 2025-06-21

## 2025-06-21 RX ADMIN — SODIUM CHLORIDE 1000 ML: 9 INJECTION, SOLUTION INTRAVENOUS at 07:06

## 2025-06-21 RX ADMIN — ONDANSETRON 8 MG: 4 TABLET, ORALLY DISINTEGRATING ORAL at 06:06

## 2025-06-21 RX ADMIN — ONDANSETRON 4 MG: 2 INJECTION INTRAMUSCULAR; INTRAVENOUS at 09:06

## 2025-06-21 RX ADMIN — SODIUM CHLORIDE 1000 ML: 9 INJECTION, SOLUTION INTRAVENOUS at 08:06

## 2025-06-21 NOTE — FIRST PROVIDER EVALUATION
Medical screening examination initiated.  I have conducted a focused provider triage encounter, findings are as follows:    Brief history of present illness:  25 year old patient presents to the ED c/ nausea, vomiting, diarrhea since 11am today. Patient states that she had 4 episodes since then and admits to having weakness with fall and hitting nose on bathroom door frame. Patient states that she feels as if she did blackout. Fall not witnessed. Patient states that she feels as if she at bad food at 9am today.     There were no vitals filed for this visit.    Pertinent physical exam:  alert, ill appearing    Brief workup plan:  upt, ua, cbc, cmp,     Preliminary workup initiated; this workup will be continued and followed by the physician or advanced practice provider that is assigned to the patient when roomed.

## 2025-06-22 NOTE — ED PROVIDER NOTES
Encounter Date: 6/21/2025    SCRIBE #1 NOTE: I, Anita Lopez, am scribing for, and in the presence of,  Chirag Segal IV, MD. I have scribed the following portions of the note - Other sections scribed: HPI, ROS, PE.       History     Chief Complaint   Patient presents with    Vomiting     NVD since 11 AM, x 4 episodes, thinks she ate some bad food at 0930, fell and hit head after vomiting at 1700, unknown LOC, -BT      24 yo female presents to ED complaining of vomiting onset this morning. Pt reports she ate food containing shrimp for breakfast and two hours later became nauseas and began vomiting. Pt reports she could not keep anything down, tried to eat and nap, and the vomiting worsened and was accompanied with diarrhea. Pt reports abdominal cramping that she associates with throwing up, and denies pain localized to one region. Pt states her symptoms have been mostly resolved, and her only current complaint is nausea.    The history is provided by the patient. No  was used.     Review of patient's allergies indicates:  No Known Allergies  Past Medical History:   Diagnosis Date    Known health problems: none      Past Surgical History:   Procedure Laterality Date    NO PAST SURGERIES       Family History   Problem Relation Name Age of Onset    Diabetes Father       Social History[1]  Review of Systems   Constitutional:  Negative for chills and fever.   HENT:  Negative for congestion, rhinorrhea and sore throat.    Eyes:  Negative for visual disturbance.   Respiratory:  Negative for cough and shortness of breath.    Cardiovascular:  Negative for chest pain and leg swelling.   Gastrointestinal:  Positive for abdominal pain, diarrhea, nausea and vomiting.   Genitourinary:  Negative for dysuria, hematuria, vaginal bleeding and vaginal discharge.   Musculoskeletal:  Negative for joint swelling.   Skin:  Negative for rash.   Neurological:  Negative for weakness.   Psychiatric/Behavioral:   Negative for confusion.        Physical Exam     Initial Vitals [06/21/25 1852]   BP Pulse Resp Temp SpO2   109/75 91 17 97.6 °F (36.4 °C) 97 %      MAP       --         Physical Exam    Nursing note and vitals reviewed.  Constitutional: She is not diaphoretic. No distress.   HENT:   Head: Normocephalic and atraumatic.   Neck: Neck supple.   Normal range of motion.  Cardiovascular:  Normal rate and regular rhythm.           No murmur heard.  Pulmonary/Chest: Breath sounds normal. No respiratory distress.   Abdominal: Abdomen is soft. She exhibits no distension. There is no abdominal tenderness.   Musculoskeletal:      Cervical back: Normal range of motion and neck supple.     Neurological: She is alert and oriented to person, place, and time. She has normal strength. No cranial nerve deficit or sensory deficit.   Skin: Skin is warm. Capillary refill takes less than 2 seconds.   Psychiatric: She has a normal mood and affect.         ED Course   Procedures  Labs Reviewed   URINALYSIS, REFLEX TO URINE CULTURE - Abnormal       Result Value    Color, UA Light-Yellow      Appearance, UA Clear      Specific Gravity, UA 1.019      pH, UA 5.5      Protein, UA Negative      Glucose, UA Normal      Ketones, UA 4+ (*)     Blood, UA Negative      Bilirubin, UA Negative      Urobilinogen, UA Normal      Nitrites, UA Negative      Leukocyte Esterase, UA Negative      RBC, UA 0-5      WBC, UA 0-5      Bacteria, UA None Seen      Squamous Epithelial Cells, UA Trace      Mucous, UA Trace (*)    COMPREHENSIVE METABOLIC PANEL - Abnormal    Sodium 139      Potassium 4.1      Chloride 105      CO2 23      Glucose 108 (*)     Blood Urea Nitrogen 17.7      Creatinine 0.70      Calcium 9.4      Protein Total 7.4      Albumin 4.2      Globulin 3.2      Albumin/Globulin Ratio 1.3      Bilirubin Total 0.6      ALP 74      ALT 13      AST 16      eGFR >60      Anion Gap 11.0      BUN/Creatinine Ratio 25     CBC WITH DIFFERENTIAL - Abnormal     WBC 12.06 (*)     RBC 5.03      Hgb 14.8      Hct 43.3      MCV 86.1      MCH 29.4      MCHC 34.2      RDW 12.1      Platelet 312      MPV 9.6      Neut % 88.1      Lymph % 7.2      Mono % 3.8      Eos % 0.2      Basophil % 0.3      Imm Grans % 0.4      Neut # 10.62 (*)     Lymph # 0.87      Mono # 0.46      Eos # 0.02      Baso # 0.04      Imm Gran # 0.05 (*)     NRBC% 0.0     PREGNANCY TEST, URINE RAPID - Normal    hCG Qualitative, Urine Negative     CBC W/ AUTO DIFFERENTIAL    Narrative:     The following orders were created for panel order CBC auto differential.  Procedure                               Abnormality         Status                     ---------                               -----------         ------                     CBC with Differential[[id number]]       Abnormal            Final result                 Please view results for these tests on the individual orders.          Imaging Results              CT Head Without Contrast (Final result)  Result time 06/21/25 20:14:31      Final result by Nicolas Stack MD (06/21/25 20:14:31)                   Impression:      No acute intracranial findings.      Electronically signed by: Nicolas Stack  Date:    06/21/2025  Time:    20:14               Narrative:    EXAMINATION:  CT HEAD WITHOUT CONTRAST    CLINICAL HISTORY:  Head trauma, moderate-severe;    TECHNIQUE:  CT imaging of the head performed from the skull base to the vertex without intravenous contrast.   mGycm. Automatic exposure control, adjustment of mA/kV or iterative reconstruction technique was used to reduce radiation.    COMPARISON:  None Available.    FINDINGS:  There is no acute cortical infarct, hemorrhage or mass lesion.  The ventricles are normal in size.    Visualized paranasal sinuses and mastoid air cells are clear. The calvarium is grossly intact                                       Medications   ondansetron disintegrating tablet 8 mg (8 mg Oral Given 6/21/25 1586)    sodium chloride 0.9% bolus 1,000 mL 1,000 mL (0 mLs Intravenous Stopped 6/21/25 2020)   sodium chloride 0.9% bolus 1,000 mL 1,000 mL (1,000 mLs Intravenous New Bag 6/21/25 2046)   ondansetron injection 4 mg (4 mg Intravenous Given 6/21/25 2138)     Medical Decision Making  25-year-old presenting with nausea vomiting diarrhea after eating some old food  Well-appearing normal labs hydrated felt much better  Consistent with food poisoning will discharge with supportive care close PCP follow up patient amenable to plan  She did fall and hit her head at some point during all this CT head negative    Differential diagnosis (including but not limited to):   Food poisioning, dehydration, sonu, electrolyte derangement, ich    Problems Addressed:  Closed head injury, initial encounter: acute illness or injury that poses a threat to life or bodily functions  Dehydration: acute illness or injury that poses a threat to life or bodily functions  Food poisoning: acute illness or injury that poses a threat to life or bodily functions  Nausea and vomiting, unspecified vomiting type: acute illness or injury that poses a threat to life or bodily functions    Amount and/or Complexity of Data Reviewed  Labs: ordered.  Radiology: ordered and independent interpretation performed.     Details: Head CT - no obvious bleed or mass       Risk  Prescription drug management.            Scribe Attestation:   Scribe #1: I performed the above scribed service and the documentation accurately describes the services I performed. I attest to the accuracy of the note.    Attending Attestation:           Physician Attestation for Scribe:  Physician Attestation Statement for Scribe #1: I, Chirag Segal IV, MD, reviewed documentation, as scribed by Anita Lopez in my presence, and it is both accurate and complete.             ED Course as of 06/22/25 0316   Sat Jun 21, 2025   2159 Feels better, labs wnl. PO challenge successful. Will discharge with  symptomatic care, return precautions given  [AC]      ED Course User Index  [AC] Chirag Segal IV, MD                           Clinical Impression:  Final diagnoses:  [R11.2] Nausea and vomiting, unspecified vomiting type  [A05.9] Food poisoning (Primary)  [E86.0] Dehydration  [S09.90XA] Closed head injury, initial encounter          ED Disposition Condition    Discharge Stable          ED Prescriptions       Medication Sig Dispense Start Date End Date Auth. Provider    ondansetron (ZOFRAN-ODT) 4 MG TbDL Take 1 tablet (4 mg total) by mouth every 6 (six) hours as needed. 20 tablet 6/21/2025 6/28/2025 Chirag Segal IV, MD          Follow-up Information       Follow up With Specialties Details Why Contact Info    [facility] - Emergency Dept Emergency Medicine Go to  If symptoms worsen [address] [facility] [place] [address] [phone]    Sydney Hodges MD Family Medicine Schedule an appointment as soon as possible for a visit   [address]  [address]  [facility]  [facility] [id number] [phone]      Primary care physician  Schedule an appointment as soon as possible for a visit   Follow up with you primary care physician.   If you do not have a primary care physician call [phone] to schedule an appointment.                   [1]   Social History  Tobacco Use    Smoking status: Never   Substance Use Topics    Alcohol use: Not Currently        Chirag Segal IV, MD  06/22/25 4133

## 2025-06-22 NOTE — DISCHARGE INSTRUCTIONS
Thanks for letting use take care of you today! It is our goal to give you courteous care and to keep you comfortable and informed. If you have any questions before you leave I will be happy to try and answer them.     Advice after your visit:  Your visit in the emergency department is NOT definitive care - please follow-up with your primary care doctor and/or specialist within 1-2 days. If you do not have a primary care physician call [phone] to schedule an appointment. Please return if you have any worsening in your condition or if you have any other concerns.    Return to the emergency department if any worsening symptoms including fever, chest pain, difficulty breathing, weakness, numbness, tingling, nausea, vomiting, inability to eat, drink or take your medication, or any other new symptoms or concerns arise.      Please signup for MyChart as noted below in your paperwork to review all labwork, imaging results, and any other incidental findings from today's visit.     If you had radiology exams like an XRAY or CT in the emergency Department the interpreation on them may be preliminary - there may be less time sensitive findings on the reports please obtain these reports within 24 hours from the hospital or by using your out on your mobile phone to access records.  Bring these to your primary care doctor and/or specialist for further review of incidental findings.    Please review any LAB WORK from your visit today with your primary care physician.    If you were prescribed OPIATE PAIN MEDICATION - please understand of these medications can be addictive, you may fill less of the prescription was written for, you do not have to take the full prescription.  You may discard what you do not use.  Please seek help if you feel you are having problems with addiction.  Do not drive or operate heavy machinery if you are taking sedating medications.  Do not mix these medications with alcohol.      If you had a SPLINT  placed in the emergency department if you have severe pain numbness tingling or discoloration of year digits please remove the splint and return to the emergency department for further evaluation as this may represent a sign of compromise to the nerves or blood vessels due to swelling.    If you had SUTURES in the emergency department please have them removed in the prescribed time frame typically within 7-14 days.  You may shower but please do not bathe or swim.  Keep the wounds clean and dry and covered with a clean dressing.  Please return if he have any signs of infection like redness or drainage or pain at the suture site.    Please take the full course of  any ANTIBIOTICS you were prescribed - incomplete courses of antibiotics can cause resistance to antibiotics in the future which will make it difficult to treat any infections you may have.